# Patient Record
Sex: FEMALE | Race: ASIAN | URBAN - METROPOLITAN AREA
[De-identification: names, ages, dates, MRNs, and addresses within clinical notes are randomized per-mention and may not be internally consistent; named-entity substitution may affect disease eponyms.]

---

## 2022-06-15 ENCOUNTER — LAB OUTSIDE AN ENCOUNTER (OUTPATIENT)
Dept: URBAN - METROPOLITAN AREA CLINIC 115 | Facility: CLINIC | Age: 52
End: 2022-06-15

## 2022-06-15 ENCOUNTER — OFFICE VISIT (OUTPATIENT)
Dept: URBAN - METROPOLITAN AREA CLINIC 115 | Facility: CLINIC | Age: 52
End: 2022-06-15
Payer: COMMERCIAL

## 2022-06-15 VITALS
TEMPERATURE: 97.3 F | BODY MASS INDEX: 25.13 KG/M2 | SYSTOLIC BLOOD PRESSURE: 115 MMHG | WEIGHT: 128 LBS | DIASTOLIC BLOOD PRESSURE: 69 MMHG | HEIGHT: 60 IN | HEART RATE: 93 BPM

## 2022-06-15 DIAGNOSIS — K62.5 RECTAL BLEED: ICD-10-CM

## 2022-06-15 DIAGNOSIS — K60.2 ANAL FISSURE: ICD-10-CM

## 2022-06-15 DIAGNOSIS — R19.8 CHANGE IN BOWEL FUNCTION: ICD-10-CM

## 2022-06-15 DIAGNOSIS — K64.4 SKIN TAG OF ANUS: ICD-10-CM

## 2022-06-15 DIAGNOSIS — K64.9 HEMORRHOID: ICD-10-CM

## 2022-06-15 PROBLEM — 70153002 HEMORRHOID: Status: ACTIVE | Noted: 2022-06-15

## 2022-06-15 PROBLEM — 195469007: Status: ACTIVE | Noted: 2022-06-15

## 2022-06-15 PROBLEM — 30037006: Status: ACTIVE | Noted: 2022-06-15

## 2022-06-15 PROBLEM — 12063002: Status: ACTIVE | Noted: 2022-06-15

## 2022-06-15 PROCEDURE — 99243 OFF/OP CNSLTJ NEW/EST LOW 30: CPT | Performed by: INTERNAL MEDICINE

## 2022-06-15 PROCEDURE — 99213 OFFICE O/P EST LOW 20 MIN: CPT | Performed by: INTERNAL MEDICINE

## 2022-06-15 RX ORDER — ASCORBIC ACID 1000 MG
1 CAPSULE TABLET ORAL ONCE A DAY
Status: ACTIVE | COMMUNITY

## 2022-06-15 RX ORDER — LEVOTHYROXINE SODIUM 100 UG/1
TABLET ORAL
Qty: 0 | Refills: 0 | Status: ACTIVE | COMMUNITY
Start: 1900-01-01

## 2022-06-15 NOTE — HPI-TODAY'S VISIT:
bowel movement, changes, off and on blood, loose . for 2-3 years, no abdominal pain,   small amoutn of blood, off and on blood. no pain , no wt loss  no n/v.  feels tired  thyroid cancer is normal,

## 2022-06-16 PROBLEM — 88111009: Status: ACTIVE | Noted: 2022-06-15

## 2022-06-20 ENCOUNTER — TELEPHONE ENCOUNTER (OUTPATIENT)
Dept: URBAN - METROPOLITAN AREA CLINIC 115 | Facility: CLINIC | Age: 52
End: 2022-06-20

## 2022-06-21 ENCOUNTER — OFFICE VISIT (OUTPATIENT)
Dept: URBAN - METROPOLITAN AREA SURGERY CENTER 13 | Facility: SURGERY CENTER | Age: 52
End: 2022-06-21
Payer: COMMERCIAL

## 2022-06-21 DIAGNOSIS — D12.0 ADENOMA OF CECUM: ICD-10-CM

## 2022-06-21 DIAGNOSIS — R19.4 ALTERATION IN BOWEL ELIMINATION: ICD-10-CM

## 2022-06-21 DIAGNOSIS — K92.1 ACUTE MELENA: ICD-10-CM

## 2022-06-21 PROCEDURE — 45385 COLONOSCOPY W/LESION REMOVAL: CPT | Performed by: INTERNAL MEDICINE

## 2022-06-21 PROCEDURE — G8907 PT DOC NO EVENTS ON DISCHARG: HCPCS | Performed by: INTERNAL MEDICINE

## 2022-06-21 RX ORDER — ASCORBIC ACID 1000 MG
1 CAPSULE TABLET ORAL ONCE A DAY
Status: ACTIVE | COMMUNITY

## 2022-06-21 RX ORDER — LEVOTHYROXINE SODIUM 100 UG/1
TABLET ORAL
Qty: 0 | Refills: 0 | Status: ACTIVE | COMMUNITY
Start: 1900-01-01

## 2022-07-07 ENCOUNTER — TELEPHONE ENCOUNTER (OUTPATIENT)
Dept: URBAN - METROPOLITAN AREA CLINIC 115 | Facility: CLINIC | Age: 52
End: 2022-07-07

## 2022-07-12 ENCOUNTER — TELEPHONE ENCOUNTER (OUTPATIENT)
Dept: URBAN - METROPOLITAN AREA CLINIC 115 | Facility: CLINIC | Age: 52
End: 2022-07-12

## 2023-07-12 ENCOUNTER — TELEPHONE ENCOUNTER (OUTPATIENT)
Dept: URBAN - METROPOLITAN AREA CLINIC 115 | Facility: CLINIC | Age: 53
End: 2023-07-12

## 2023-07-12 ENCOUNTER — OFFICE VISIT (OUTPATIENT)
Dept: URBAN - METROPOLITAN AREA CLINIC 115 | Facility: CLINIC | Age: 53
End: 2023-07-12
Payer: COMMERCIAL

## 2023-07-12 ENCOUNTER — WEB ENCOUNTER (OUTPATIENT)
Dept: URBAN - METROPOLITAN AREA CLINIC 115 | Facility: CLINIC | Age: 53
End: 2023-07-12

## 2023-07-12 VITALS
HEIGHT: 60 IN | HEART RATE: 90 BPM | BODY MASS INDEX: 25.4 KG/M2 | DIASTOLIC BLOOD PRESSURE: 77 MMHG | SYSTOLIC BLOOD PRESSURE: 116 MMHG | WEIGHT: 129.4 LBS | TEMPERATURE: 97.9 F

## 2023-07-12 DIAGNOSIS — K92.1 HEMATOCHEZIA: ICD-10-CM

## 2023-07-12 PROBLEM — 449341000124102: Status: ACTIVE | Noted: 2023-07-12

## 2023-07-12 PROCEDURE — 99214 OFFICE O/P EST MOD 30 MIN: CPT | Performed by: STUDENT IN AN ORGANIZED HEALTH CARE EDUCATION/TRAINING PROGRAM

## 2023-07-12 RX ORDER — ASCORBIC ACID 1000 MG
1 CAPSULE TABLET ORAL ONCE A DAY
Status: ACTIVE | COMMUNITY

## 2023-07-12 RX ORDER — LEVOTHYROXINE SODIUM 100 UG/1
TABLET ORAL
Qty: 0 | Refills: 0 | Status: ACTIVE | COMMUNITY
Start: 1900-01-01

## 2023-07-12 NOTE — PHYSICAL EXAM GASTROINTESTINAL
Abdomen soft, diffused abd discomfort all over, nondistended , no guarding or rigidity , no masses palpable , unremarkable bowel sounds , no hepatosplenomegaly

## 2023-07-12 NOTE — HPI-TODAY'S VISIT:
52 y.o female presents today for concerns of hematochezia that started last night. Patient admits of over 45mins of hematochezia, multiple BMs episodes.  Last bleeding episode, this morning around 11AM. Also admits of lower abd cramp. No n/v. Admits feeling of SOB and dizziness. No CP or fever. She states prior to the onset, she was having her normal spicy meals. Unsure if this triggers it or not. Her last colonoscopy was in 2022, one TA noted along w/ hemorrhoids in perianal region.

## 2023-07-12 NOTE — PHYSICAL EXAM CONSTITUTIONAL:
NAD, alert and oriented, well developed, well nourished, ambulating without difficulty, sitting comfortably in the chair.  by side

## 2023-07-13 ENCOUNTER — TELEPHONE ENCOUNTER (OUTPATIENT)
Dept: URBAN - METROPOLITAN AREA CLINIC 115 | Facility: CLINIC | Age: 53
End: 2023-07-13

## 2023-08-02 ENCOUNTER — OFFICE VISIT (OUTPATIENT)
Dept: URBAN - METROPOLITAN AREA CLINIC 115 | Facility: CLINIC | Age: 53
End: 2023-08-02
Payer: COMMERCIAL

## 2023-08-02 ENCOUNTER — DASHBOARD ENCOUNTERS (OUTPATIENT)
Age: 53
End: 2023-08-02

## 2023-08-02 VITALS
DIASTOLIC BLOOD PRESSURE: 67 MMHG | HEART RATE: 77 BPM | SYSTOLIC BLOOD PRESSURE: 108 MMHG | TEMPERATURE: 98.3 F | BODY MASS INDEX: 25.56 KG/M2 | WEIGHT: 130.2 LBS | HEIGHT: 60 IN

## 2023-08-02 DIAGNOSIS — K62.5 RECTAL BLEED: ICD-10-CM

## 2023-08-02 DIAGNOSIS — K64.9 HEMORRHOID: ICD-10-CM

## 2023-08-02 DIAGNOSIS — K60.2 ANAL FISSURE: ICD-10-CM

## 2023-08-02 DIAGNOSIS — K64.4 SKIN TAG OF ANUS: ICD-10-CM

## 2023-08-02 DIAGNOSIS — R19.8 CHANGE IN BOWEL FUNCTION: ICD-10-CM

## 2023-08-02 PROCEDURE — 99213 OFFICE O/P EST LOW 20 MIN: CPT | Performed by: INTERNAL MEDICINE

## 2023-08-02 RX ORDER — LEVOTHYROXINE SODIUM 100 UG/1
TABLET ORAL
Qty: 0 | Refills: 0 | Status: ACTIVE | COMMUNITY
Start: 1900-01-01

## 2023-08-02 RX ORDER — ASCORBIC ACID 1000 MG
1 CAPSULE TABLET ORAL ONCE A DAY
Status: ACTIVE | COMMUNITY

## 2023-08-02 NOTE — PHYSICAL EXAM HENT:
T 103 last night. Feels nauseated w/ chest pain and h/a.  sensitive to light.  Frontal h/a radiating to back of scalp. Denies weakness in extremities or tingling/numbness.  Got dizzy earlier walking up steps.    normocephalic, atraumatic, Face within normal limits,  , External ears within normal limits, no drainage bilaterally,  External nose  normal appearance,

## 2023-08-02 NOTE — HPI-FREE TEXT
PATIENT HAD COLONOSCOPY ON 7/26 NEG, AND CT SCAN NEGATIVE IN [facility].   WENT TO ER FOR HEMATOCHEZIA  NO BLEEDING. OCC CONSTIPATION

## 2023-08-28 ENCOUNTER — OFFICE VISIT (OUTPATIENT)
Dept: URBAN - METROPOLITAN AREA CLINIC 115 | Facility: CLINIC | Age: 53
End: 2023-08-28